# Patient Record
Sex: FEMALE | Race: WHITE | NOT HISPANIC OR LATINO | Employment: OTHER | URBAN - METROPOLITAN AREA
[De-identification: names, ages, dates, MRNs, and addresses within clinical notes are randomized per-mention and may not be internally consistent; named-entity substitution may affect disease eponyms.]

---

## 2023-10-04 ENCOUNTER — HOSPITAL ENCOUNTER (OUTPATIENT)
Dept: RADIOLOGY | Facility: HOSPITAL | Age: 73
Discharge: HOME | End: 2023-10-04
Payer: MEDICARE

## 2023-10-04 DIAGNOSIS — Z78.0 ASYMPTOMATIC MENOPAUSAL STATE: ICD-10-CM

## 2023-10-04 PROCEDURE — 77080 DXA BONE DENSITY AXIAL: CPT

## 2023-10-04 PROCEDURE — 77085 DXA BONE DENSITY AXL VRT FX: CPT

## 2023-10-04 PROCEDURE — 77085 DXA BONE DENSITY AXL VRT FX: CPT | Performed by: RADIOLOGY

## 2023-11-21 ENCOUNTER — TELEPHONE (OUTPATIENT)
Dept: OBSTETRICS AND GYNECOLOGY | Facility: CLINIC | Age: 73
End: 2023-11-21
Payer: MEDICARE

## 2023-11-21 DIAGNOSIS — M81.0 OSTEOPOROSIS, UNSPECIFIED OSTEOPOROSIS TYPE, UNSPECIFIED PATHOLOGICAL FRACTURE PRESENCE: ICD-10-CM

## 2023-11-21 RX ORDER — ALENDRONATE SODIUM 70 MG/1
70 TABLET ORAL
COMMUNITY
End: 2023-11-21 | Stop reason: SDUPTHER

## 2023-11-21 RX ORDER — ALENDRONATE SODIUM 70 MG/1
70 TABLET ORAL
Qty: 4 TABLET | Refills: 10 | Status: SHIPPED | OUTPATIENT
Start: 2023-11-21

## 2023-11-21 NOTE — TELEPHONE ENCOUNTER
PT CALLED AND LEFT VM ON RX LINE IN BB TO ADVISE SHE NOW IS USING [facility] IN [place] FOR HER RX SO SHE NEEDS ALENDRONATE 70MG. [facility] PT AND SHE HAD HER ANNUAL ON 9-11-23

## 2024-01-29 ENCOUNTER — LAB (OUTPATIENT)
Dept: LAB | Facility: LAB | Age: 74
End: 2024-01-29
Payer: MEDICARE

## 2024-01-29 ENCOUNTER — OFFICE VISIT (OUTPATIENT)
Dept: PRIMARY CARE | Facility: CLINIC | Age: 74
End: 2024-01-29
Payer: MEDICARE

## 2024-01-29 VITALS
HEIGHT: 66 IN | SYSTOLIC BLOOD PRESSURE: 126 MMHG | DIASTOLIC BLOOD PRESSURE: 84 MMHG | WEIGHT: 196 LBS | HEART RATE: 64 BPM | BODY MASS INDEX: 31.5 KG/M2

## 2024-01-29 DIAGNOSIS — M81.0 OSTEOPOROSIS, UNSPECIFIED OSTEOPOROSIS TYPE, UNSPECIFIED PATHOLOGICAL FRACTURE PRESENCE: ICD-10-CM

## 2024-01-29 DIAGNOSIS — E78.2 MIXED HYPERLIPIDEMIA: ICD-10-CM

## 2024-01-29 DIAGNOSIS — E55.9 VITAMIN D DEFICIENCY: ICD-10-CM

## 2024-01-29 DIAGNOSIS — M25.562 LEFT KNEE PAIN, UNSPECIFIED CHRONICITY: ICD-10-CM

## 2024-01-29 DIAGNOSIS — Z00.00 MEDICARE ANNUAL WELLNESS VISIT, SUBSEQUENT: ICD-10-CM

## 2024-01-29 DIAGNOSIS — Z00.00 HEALTHCARE MAINTENANCE: ICD-10-CM

## 2024-01-29 DIAGNOSIS — E66.9 CLASS 1 OBESITY WITHOUT SERIOUS COMORBIDITY WITH BODY MASS INDEX (BMI) OF 31.0 TO 31.9 IN ADULT, UNSPECIFIED OBESITY TYPE: ICD-10-CM

## 2024-01-29 DIAGNOSIS — Z00.00 MEDICARE ANNUAL WELLNESS VISIT, SUBSEQUENT: Primary | ICD-10-CM

## 2024-01-29 DIAGNOSIS — H40.9 GLAUCOMA OF BOTH EYES, UNSPECIFIED GLAUCOMA TYPE: ICD-10-CM

## 2024-01-29 PROBLEM — E66.811 CLASS 1 OBESITY WITHOUT SERIOUS COMORBIDITY WITH BODY MASS INDEX (BMI) OF 31.0 TO 31.9 IN ADULT: Status: ACTIVE | Noted: 2024-01-29

## 2024-01-29 LAB
ALBUMIN SERPL BCP-MCNC: 4.4 G/DL (ref 3.4–5)
ALP SERPL-CCNC: 72 U/L (ref 33–136)
ALT SERPL W P-5'-P-CCNC: 19 U/L (ref 7–45)
ANION GAP SERPL CALC-SCNC: 12 MMOL/L (ref 10–20)
AST SERPL W P-5'-P-CCNC: 18 U/L (ref 9–39)
BASOPHILS # BLD AUTO: 0.01 X10*3/UL (ref 0–0.1)
BASOPHILS NFR BLD AUTO: 0.2 %
BILIRUB SERPL-MCNC: 0.5 MG/DL (ref 0–1.2)
BUN SERPL-MCNC: 14 MG/DL (ref 6–23)
CALCIUM SERPL-MCNC: 9.7 MG/DL (ref 8.6–10.3)
CHLORIDE SERPL-SCNC: 108 MMOL/L (ref 98–107)
CHOLEST SERPL-MCNC: 200 MG/DL (ref 0–199)
CHOLESTEROL/HDL RATIO: 5.1
CO2 SERPL-SCNC: 26 MMOL/L (ref 21–32)
CREAT SERPL-MCNC: 0.83 MG/DL (ref 0.5–1.05)
EGFRCR SERPLBLD CKD-EPI 2021: 74 ML/MIN/1.73M*2
EOSINOPHIL # BLD AUTO: 0 X10*3/UL (ref 0–0.4)
EOSINOPHIL NFR BLD AUTO: 0 %
ERYTHROCYTE [DISTWIDTH] IN BLOOD BY AUTOMATED COUNT: 13.3 % (ref 11.5–14.5)
GLUCOSE SERPL-MCNC: 78 MG/DL (ref 74–99)
HCT VFR BLD AUTO: 46.4 % (ref 36–46)
HDLC SERPL-MCNC: 39.5 MG/DL
HGB BLD-MCNC: 15.3 G/DL (ref 12–16)
IMM GRANULOCYTES # BLD AUTO: 0.01 X10*3/UL (ref 0–0.5)
IMM GRANULOCYTES NFR BLD AUTO: 0.2 % (ref 0–0.9)
LDLC SERPL CALC-MCNC: 127 MG/DL
LYMPHOCYTES # BLD AUTO: 1.17 X10*3/UL (ref 0.8–3)
LYMPHOCYTES NFR BLD AUTO: 23.8 %
MCH RBC QN AUTO: 30.5 PG (ref 26–34)
MCHC RBC AUTO-ENTMCNC: 33 G/DL (ref 32–36)
MCV RBC AUTO: 92 FL (ref 80–100)
MONOCYTES # BLD AUTO: 0.4 X10*3/UL (ref 0.05–0.8)
MONOCYTES NFR BLD AUTO: 8.1 %
NEUTROPHILS # BLD AUTO: 3.33 X10*3/UL (ref 1.6–5.5)
NEUTROPHILS NFR BLD AUTO: 67.7 %
NON HDL CHOLESTEROL: 161 MG/DL (ref 0–149)
NRBC BLD-RTO: 0 /100 WBCS (ref 0–0)
PLATELET # BLD AUTO: 248 X10*3/UL (ref 150–450)
POTASSIUM SERPL-SCNC: 4.4 MMOL/L (ref 3.5–5.3)
PROT SERPL-MCNC: 7.2 G/DL (ref 6.4–8.2)
RBC # BLD AUTO: 5.02 X10*6/UL (ref 4–5.2)
SODIUM SERPL-SCNC: 142 MMOL/L (ref 136–145)
TRIGL SERPL-MCNC: 167 MG/DL (ref 0–149)
TSH SERPL-ACNC: 2.59 MIU/L (ref 0.44–3.98)
URATE SERPL-MCNC: 4 MG/DL (ref 2.3–6.7)
VLDL: 33 MG/DL (ref 0–40)
WBC # BLD AUTO: 4.9 X10*3/UL (ref 4.4–11.3)

## 2024-01-29 PROCEDURE — 83036 HEMOGLOBIN GLYCOSYLATED A1C: CPT

## 2024-01-29 PROCEDURE — 84550 ASSAY OF BLOOD/URIC ACID: CPT

## 2024-01-29 PROCEDURE — G0439 PPPS, SUBSEQ VISIT: HCPCS | Performed by: STUDENT IN AN ORGANIZED HEALTH CARE EDUCATION/TRAINING PROGRAM

## 2024-01-29 PROCEDURE — 3008F BODY MASS INDEX DOCD: CPT | Performed by: STUDENT IN AN ORGANIZED HEALTH CARE EDUCATION/TRAINING PROGRAM

## 2024-01-29 PROCEDURE — 80061 LIPID PANEL: CPT

## 2024-01-29 PROCEDURE — 36415 COLL VENOUS BLD VENIPUNCTURE: CPT

## 2024-01-29 PROCEDURE — 1170F FXNL STATUS ASSESSED: CPT | Performed by: STUDENT IN AN ORGANIZED HEALTH CARE EDUCATION/TRAINING PROGRAM

## 2024-01-29 PROCEDURE — 1158F ADVNC CARE PLAN TLK DOCD: CPT | Performed by: STUDENT IN AN ORGANIZED HEALTH CARE EDUCATION/TRAINING PROGRAM

## 2024-01-29 PROCEDURE — 1159F MED LIST DOCD IN RCRD: CPT | Performed by: STUDENT IN AN ORGANIZED HEALTH CARE EDUCATION/TRAINING PROGRAM

## 2024-01-29 PROCEDURE — 1123F ACP DISCUSS/DSCN MKR DOCD: CPT | Performed by: STUDENT IN AN ORGANIZED HEALTH CARE EDUCATION/TRAINING PROGRAM

## 2024-01-29 PROCEDURE — 84443 ASSAY THYROID STIM HORMONE: CPT

## 2024-01-29 PROCEDURE — 85025 COMPLETE CBC W/AUTO DIFF WBC: CPT

## 2024-01-29 PROCEDURE — 80053 COMPREHEN METABOLIC PANEL: CPT

## 2024-01-29 PROCEDURE — 1160F RVW MEDS BY RX/DR IN RCRD: CPT | Performed by: STUDENT IN AN ORGANIZED HEALTH CARE EDUCATION/TRAINING PROGRAM

## 2024-01-29 PROCEDURE — 1036F TOBACCO NON-USER: CPT | Performed by: STUDENT IN AN ORGANIZED HEALTH CARE EDUCATION/TRAINING PROGRAM

## 2024-01-29 PROCEDURE — 1157F ADVNC CARE PLAN IN RCRD: CPT | Performed by: STUDENT IN AN ORGANIZED HEALTH CARE EDUCATION/TRAINING PROGRAM

## 2024-01-29 PROCEDURE — 99204 OFFICE O/P NEW MOD 45 MIN: CPT | Performed by: STUDENT IN AN ORGANIZED HEALTH CARE EDUCATION/TRAINING PROGRAM

## 2024-01-29 RX ORDER — BRIMONIDINE TARTRATE 2 MG/ML
SOLUTION/ DROPS OPHTHALMIC
COMMUNITY
Start: 2024-01-09

## 2024-01-29 RX ORDER — PNV NO.95/FERROUS FUM/FOLIC AC 28MG-0.8MG
TABLET ORAL
COMMUNITY
Start: 2022-09-15

## 2024-01-29 RX ORDER — MELOXICAM 15 MG/1
15 TABLET ORAL
COMMUNITY
Start: 2023-10-31

## 2024-01-29 RX ORDER — LATANOPROST 50 UG/ML
1 SOLUTION/ DROPS OPHTHALMIC
COMMUNITY
Start: 2021-03-23

## 2024-01-29 RX ORDER — CHOLECALCIFEROL (VITAMIN D3) 125 MCG
5000 CAPSULE ORAL
COMMUNITY
Start: 2022-06-29

## 2024-01-29 RX ORDER — TIMOLOL MALEATE 5 MG/ML
1 SOLUTION/ DROPS OPHTHALMIC 2 TIMES DAILY
COMMUNITY
Start: 2021-03-04

## 2024-01-29 ASSESSMENT — ENCOUNTER SYMPTOMS
OCCASIONAL FEELINGS OF UNSTEADINESS: 0
DEPRESSION: 0
LOSS OF SENSATION IN FEET: 0

## 2024-01-29 ASSESSMENT — PATIENT HEALTH QUESTIONNAIRE - PHQ9
2. FEELING DOWN, DEPRESSED OR HOPELESS: NOT AT ALL
1. LITTLE INTEREST OR PLEASURE IN DOING THINGS: NOT AT ALL
SUM OF ALL RESPONSES TO PHQ9 QUESTIONS 1 AND 2: 0

## 2024-01-29 ASSESSMENT — ACTIVITIES OF DAILY LIVING (ADL)
GROCERY_SHOPPING: INDEPENDENT
MANAGING_FINANCES: INDEPENDENT
BATHING: INDEPENDENT
TAKING_MEDICATION: INDEPENDENT
DOING_HOUSEWORK: INDEPENDENT

## 2024-01-29 NOTE — PROGRESS NOTES
Subjective   Reason for Visit: Georgie Robles is an 73 y.o. female here for a Medicare Wellness visit.     Past Medical, Surgical, and Family History reviewed and updated in chart.    Reviewed all medications by prescribing practitioner or clinical pharmacist (such as prescriptions, OTCs, herbal therapies and supplements) and documented in the medical record.    HPI  New patient visit/ Health maintenance/Medicare wellness visit  Overall patient is doing well.  Denies any chest pain, shortness of breath or wheezing on exertion.  Denies any nausea/vomiting or diarrhea/constipation.  Denies any urinary symptoms.  Denies any dizziness or balance problems.    Immunization: Tdap declined  Influenza vaccine declined  Shingrix 2 doses  , Prevnar 20 declined  COVID-19 vaccine (Pfizer Moderna) 3 doses:  Colon Cancer Screening: No family history, Cologuard 05/2023 was negative  OB/GYN: Pap smear last 2015, deferred, mammogram 08/2023 is up to date.   Diet: Tries to eat a well-balanced diet, has cut down on red meat, states that she is struggling to lose some weight  Exercise: Used to exercise regularly and go for hiking until 6 months ago, has stopped due to her left knee pain  Tobacco: Denies use  EtOH: Rarely Socially      Hyperlipidemia  Last lipid panel Chol 236, , Triglycerides 123  Patient has a hx of statin intolerance  Patient was started on Zetia 10 mg 6 months ago, she continues to take it only for 3 weeks, then she started experiencing side effect of medication.  She states she had several joints pain and and swelling, also she had muscle pain.  Has stopped any cholesterol-lowering medication, she is trying to control it with diet modification.  Willing to assess cardiac calcium score, for further evaluation and treatment plan    Osteoporosis  Follows with OBGYN Dr. Gottlieb  Last Dexa 10/2023 showed osteopenia  Currently on Alendronate 70 mg daily and Calcium suplements    Vitamin D deficiency  Last  "Vitamin D level wnl  Continues to take Calcium and Vit D    Glaucoma  Follows with ophthalmology    6.  Left knee pain/right elbow pain  Patient states that she has been experiencing left knee pain and right elbow pain started 6 months ago.  The pain has limited her in doing exercises.  Willing to check her uric acid level    Allergies   Allergen Reactions    Epinephrine Syncope     Passes out    Sulfa (Sulfonamide Antibiotics) Unknown       Immunization History   Administered Date(s) Administered    Influenza, High Dose Seasonal, Preservative Free 11/03/2020    Pfizer COVID-19 vaccine, Fall 2023, 12 years and older, (30mcg/0.3mL) 10/31/2023    Pfizer Purple Cap SARS-CoV-2 02/25/2021, 03/25/2021, 10/22/2021    Zoster vaccine, recombinant, adult (SHINGRIX) 06/09/2022, 08/11/2022       Patient Self Assessment of Health Status  Patient Self Assessment: Good    Nutrition and Exercise  Current Diet: Well Balanced Diet  Adequate Fluid Intake: Yes  Caffeine: Yes  Exercise Frequency: No Exercise    Functional Ability/Level of Safety  Cognitive Impairment Observed: No cognitive impairment observed  Cognitive Impairment Reported: No cognitive impairment reported by patient or family    Home Safety Risk Factors: None    Patient Care Team:  Belinda Gallego DO as PCP - General (Family Medicine)     Review of Systems  All pertinent positive symptoms are included in the history of present illness.    All other systems have been reviewed and are negative and noncontributory to this patient's current ailments.    Objective   Vitals:  /84   Pulse 64   Ht 1.676 m (5' 6\")   Wt 88.9 kg (196 lb)   BMI 31.64 kg/m²          Physical Exam  CONSTITUTIONAL - well nourished, well developed, looks like stated age, in no acute distress  SKIN - normal skin color and pigmentation, normal skin turgor without rash, lesions, or nodules visualized  HEAD - no trauma, normocephalic  EYES - pupils are equal and reactive to light, " extraocular muscles are intact, and normal external exam  ENT - uvula midline, normal tongue movement, throat without erythema or exudate, nasal passage without discharge and patent  NECK - supple without rigidity, no neck mass was observed, no thyromegaly   CHEST - clear to auscultation, no wheezing, no crackles and no rales, good effort  CARDIAC - regular rhythm and rate, no murmurs, normal S1 and S2  ABDOMEN - no organomegaly, soft, nontender, nondistended, normal bowel sounds, no guarding/rebound/rigidity  EXTREMITIES - no edema, no deformities  NEUROLOGICAL - normal gait, normal balance, normal motor, no ataxia, alert and oriented x3   PSYCHIATRIC - alert, pleasant and cordial, age-appropriate  IMMUNOLOGIC - no cervical lymphadenopathy     Assessment/Plan   Problem List Items Addressed This Visit       Class 1 obesity without serious comorbidity with body mass index (BMI) of 31.0 to 31.9 in adult    Glaucoma of both eyes    Vitamin D deficiency    Osteoporosis    Mixed hyperlipidemia    Relevant Orders    Lipid Panel     Other Visit Diagnoses       Medicare annual wellness visit, subsequent    -  Primary    Relevant Orders    TSH with reflex to Free T4 if abnormal    Lipid Panel    Comprehensive Metabolic Panel    CBC and Auto Differential    Hemoglobin A1C    Healthcare maintenance        Relevant Orders    TSH with reflex to Free T4 if abnormal    Lipid Panel    Comprehensive Metabolic Panel    CBC and Auto Differential    Hemoglobin A1C    Need for Tdap vaccination        Left knee pain, unspecified chronicity              Medicare wellness visit/health maintenance  Complete history and physical examination was performed  Requisition for CBC, A1c, CMP, lipid panel, TSH was provided today  We will notify of test results once available and make treatment recommendations accordingly   We had a discussion about preventing future Mis, ways to improve diet and weight loss.  We had a discussion about advanced  directive and power of .  Patient states that her daughter is her healthcare power of  D.  Encouraged the patient to bring all the paperwork to the office    Hyperlipidemia/obesity  Requisition for lipid panel and CT cardiac calcium score was provided today  Please call to schedule appointment at earliest mutual convenience.  Will review results and make treatment recommendation accordingly    Osteoporosis  Continue to follow with OB/GYN.  Continue to take alendronate 70 mg daily and calcium supplement    Vitamin D deficiency  Continue to take vitamin D supplements alongside calcium supplements    Glaucoma  Continue to follow with ophthalmology.  No further recommendation at this time    Left knee pain  Requisition for uric acid level was provided today to rule out/confirm any uric acid imbalance.  If uric acid level is high, in concordance with clinical presentation we will start treating for  gout.  Considering investigating further with knee x-rays if the knee pain persists.  Consider treating with NSAIDs and physical therapy if osteoarthritis is noted.    Thank you for letting us be a part of your care team.  Please call the office if you have further questions or concerns regarding your care    Otherwise, please follow-up in 6 months for continued care and refills     Desire Cabrera MD  PGY1 FM Resident

## 2024-01-30 DIAGNOSIS — M25.562 LEFT KNEE PAIN, UNSPECIFIED CHRONICITY: Primary | ICD-10-CM

## 2024-01-30 LAB
EST. AVERAGE GLUCOSE BLD GHB EST-MCNC: 94 MG/DL
HBA1C MFR BLD: 4.9 %

## 2024-03-04 ENCOUNTER — HOSPITAL ENCOUNTER (OUTPATIENT)
Dept: RADIOLOGY | Facility: HOSPITAL | Age: 74
Discharge: HOME | End: 2024-03-04
Payer: MEDICARE

## 2024-03-04 DIAGNOSIS — E78.2 MIXED HYPERLIPIDEMIA: ICD-10-CM

## 2024-03-04 DIAGNOSIS — M25.562 LEFT KNEE PAIN, UNSPECIFIED CHRONICITY: ICD-10-CM

## 2024-03-04 PROCEDURE — 75571 CT HRT W/O DYE W/CA TEST: CPT

## 2024-03-04 PROCEDURE — 73562 X-RAY EXAM OF KNEE 3: CPT | Mod: LEFT SIDE | Performed by: RADIOLOGY

## 2024-03-04 PROCEDURE — 73562 X-RAY EXAM OF KNEE 3: CPT | Mod: LT

## 2024-08-19 ENCOUNTER — TELEPHONE (OUTPATIENT)
Dept: OBSTETRICS AND GYNECOLOGY | Facility: HOSPITAL | Age: 74
End: 2024-08-19
Payer: MEDICARE

## 2024-08-19 DIAGNOSIS — Z12.31 VISIT FOR SCREENING MAMMOGRAM: Primary | ICD-10-CM

## 2024-08-23 ENCOUNTER — HOSPITAL ENCOUNTER (OUTPATIENT)
Dept: RADIOLOGY | Facility: HOSPITAL | Age: 74
Discharge: HOME | End: 2024-08-23
Payer: MEDICARE

## 2024-08-23 VITALS — HEIGHT: 66 IN | BODY MASS INDEX: 31.5 KG/M2 | WEIGHT: 196 LBS

## 2024-08-23 DIAGNOSIS — Z12.31 VISIT FOR SCREENING MAMMOGRAM: ICD-10-CM

## 2024-08-23 PROCEDURE — 77067 SCR MAMMO BI INCL CAD: CPT

## 2024-08-23 PROCEDURE — 77063 BREAST TOMOSYNTHESIS BI: CPT | Performed by: STUDENT IN AN ORGANIZED HEALTH CARE EDUCATION/TRAINING PROGRAM

## 2024-08-23 PROCEDURE — 77067 SCR MAMMO BI INCL CAD: CPT | Performed by: STUDENT IN AN ORGANIZED HEALTH CARE EDUCATION/TRAINING PROGRAM

## 2024-10-01 ENCOUNTER — OFFICE VISIT (OUTPATIENT)
Dept: PRIMARY CARE | Facility: CLINIC | Age: 74
End: 2024-10-01
Payer: MEDICARE

## 2024-10-01 VITALS
SYSTOLIC BLOOD PRESSURE: 146 MMHG | DIASTOLIC BLOOD PRESSURE: 86 MMHG | HEART RATE: 71 BPM | OXYGEN SATURATION: 98 % | WEIGHT: 192 LBS | BODY MASS INDEX: 30.99 KG/M2

## 2024-10-01 DIAGNOSIS — I10 PRIMARY HYPERTENSION: Primary | ICD-10-CM

## 2024-10-01 PROCEDURE — 99214 OFFICE O/P EST MOD 30 MIN: CPT | Performed by: STUDENT IN AN ORGANIZED HEALTH CARE EDUCATION/TRAINING PROGRAM

## 2024-10-01 PROCEDURE — 3077F SYST BP >= 140 MM HG: CPT | Performed by: STUDENT IN AN ORGANIZED HEALTH CARE EDUCATION/TRAINING PROGRAM

## 2024-10-01 PROCEDURE — 93000 ELECTROCARDIOGRAM COMPLETE: CPT | Performed by: STUDENT IN AN ORGANIZED HEALTH CARE EDUCATION/TRAINING PROGRAM

## 2024-10-01 PROCEDURE — 3079F DIAST BP 80-89 MM HG: CPT | Performed by: STUDENT IN AN ORGANIZED HEALTH CARE EDUCATION/TRAINING PROGRAM

## 2024-10-01 PROCEDURE — 1157F ADVNC CARE PLAN IN RCRD: CPT | Performed by: STUDENT IN AN ORGANIZED HEALTH CARE EDUCATION/TRAINING PROGRAM

## 2024-10-01 PROCEDURE — 1160F RVW MEDS BY RX/DR IN RCRD: CPT | Performed by: STUDENT IN AN ORGANIZED HEALTH CARE EDUCATION/TRAINING PROGRAM

## 2024-10-01 PROCEDURE — 1036F TOBACCO NON-USER: CPT | Performed by: STUDENT IN AN ORGANIZED HEALTH CARE EDUCATION/TRAINING PROGRAM

## 2024-10-01 PROCEDURE — 1159F MED LIST DOCD IN RCRD: CPT | Performed by: STUDENT IN AN ORGANIZED HEALTH CARE EDUCATION/TRAINING PROGRAM

## 2024-10-01 RX ORDER — AMLODIPINE BESYLATE 2.5 MG/1
2.5 TABLET ORAL DAILY
Qty: 30 TABLET | Refills: 0 | Status: SHIPPED | OUTPATIENT
Start: 2024-10-01 | End: 2024-10-31

## 2024-10-01 ASSESSMENT — PATIENT HEALTH QUESTIONNAIRE - PHQ9
1. LITTLE INTEREST OR PLEASURE IN DOING THINGS: NOT AT ALL
2. FEELING DOWN, DEPRESSED OR HOPELESS: NOT AT ALL
SUM OF ALL RESPONSES TO PHQ9 QUESTIONS 1 AND 2: 0

## 2024-10-01 NOTE — PROGRESS NOTES
Georgie Robles is a 74 y.o. year old female presenting for Hypertension       HPI:  1. Hypertension  Georgie is a 74 year old female presenting after multiple at home high blood pressure readings  She states that on Sunday she felt increasingly fatigued and decided to check her blood pressure with an at-home cuff; her result was 150's/90's and consistent over multiple checks  Her friend suggested she call paramedics to evaluate her further and they also obtained multiple elevated blood pressure with similar ranges, but determined that her next steps should be a PCP evaluation and did not require an ED visit  In the office today she continues to be elevated at 146/86  She feels well overall and denies any chest pain, shortness of breath, or lightheadedness    She does endorse occasional sharp pain above her umbilicus that lasts 2-5 seconds that started on Sunday  There is no known trigger and she thinks could be related to a loss of appetite since Sunday with minimal food intake  She denies any radiating pain, reflux, changes in bowel habits, bloody stool or rashes in the area    ECG was performed in the office and was unremarkable for any acute changes and a coronary CT score done in March 2024 was 0.18    ROS:   All pertinent positive symptoms are included in history of present illness.    All other systems have been reviewed and are negative and noncontributory to this patient's current ailments.    Current Outpatient Medications   Medication Sig Dispense Refill    alendronate (Fosamax) 70 mg tablet Take 1 tablet (70 mg) by mouth 1 (one) time per week. 4 tablet 10    amLODIPine (Norvasc) 2.5 mg tablet Take 1 tablet (2.5 mg) by mouth once daily. 30 tablet 0    brimonidine (AlphaGAN) 0.2 % ophthalmic solution       cholecalciferol (Vitamin D-3) 125 MCG (5000 UT) capsule Take 1 capsule (125 mcg) by mouth once daily.      cyanocobalamin (Vitamin B-12) 100 mcg tablet       latanoprost (Xalatan) 0.005 % ophthalmic solution  Administer 1 drop into affected eye(s).      timolol (Timoptic) 0.5 % ophthalmic solution Administer 1 drop into affected eye(s) twice a day.       No current facility-administered medications for this visit.       OBJECTIVE  Visit Vitals  /86 (BP Location: Left arm, Patient Position: Sitting, BP Cuff Size: Adult)   Pulse 71   Wt 87.1 kg (192 lb)   SpO2 98%   BMI 30.99 kg/m²   OB Status Postmenopausal   Smoking Status Never   BSA 2.01 m²        Physical Exam:  GENERAL: Alert, oriented, pleasant, in no acute distress  HEENT: Head normocephalic, atraumatic  CV: Heart with regular rate and rhythm, normal S1/S2, no murmurs; normal peripheral pulses- radial and dorsalis pedis palpable  LUNGS: CTAB without wheezing, rhonchi or rales; good respiratory effort, no increased work of breathing  ABDOMEN: soft, non-tender, non-distended, no masses appreciated; +BS in all four quadrants  EXTREMITIES: no edema, no cyanosis  PSYCH: Appropriate mood and affect  SKIN: No rashes or lesions appreciated    Assessment/Plan   Diagnoses and all orders for this visit:  Primary hypertension  Based on the history of your complaint and unremarkable ECG findings, you likely have uncontrolled hypertension which requires medical management to control    We will start you on a low dose of amlodipine to help manage your elevated blood pressure  This medication works by relaxing the blood vessels, making it easier for your heart to pump  Please continue to monitor your blood pressure at home and keep track of any symptoms you may experience  It is important to maintain a healthy lifestyle alongside the medication, including a balanced diet and regular exercise    -     amLODIPine (Norvasc) 2.5 mg tablet; Take 1 tablet (2.5 mg) by mouth once daily.  -     ECG 12 lead (Clinic Performed)    We would like to follow up in 2 weeks to assess your response to the treatment and make any necessary adjustments  If you have any questions or concerns in  the meantime, don’t hesitate to reach out

## 2024-10-14 ENCOUNTER — TELEPHONE (OUTPATIENT)
Dept: PRIMARY CARE | Facility: CLINIC | Age: 74
End: 2024-10-14
Payer: MEDICARE

## 2024-10-14 DIAGNOSIS — I10 PRIMARY HYPERTENSION: Primary | ICD-10-CM

## 2024-10-14 RX ORDER — OLMESARTAN MEDOXOMIL 20 MG/1
20 TABLET ORAL DAILY
Qty: 30 TABLET | Refills: 0 | Status: SHIPPED | OUTPATIENT
Start: 2024-10-14 | End: 2024-11-13

## 2024-10-14 NOTE — TELEPHONE ENCOUNTER
Blood pressure update.     Average 160/94      158/99  175/99  144/108  180/107  142/84  169/93    Pt feels like the medication is making her slightly nauseous as well as some fatigue and a few episodes of shakes normally when she is laying down / sleeping.

## 2024-10-15 ENCOUNTER — TELEPHONE (OUTPATIENT)
Dept: PRIMARY CARE | Facility: CLINIC | Age: 74
End: 2024-10-15
Payer: MEDICARE

## 2024-10-29 ENCOUNTER — APPOINTMENT (OUTPATIENT)
Dept: PRIMARY CARE | Facility: CLINIC | Age: 74
End: 2024-10-29
Payer: MEDICARE

## 2024-10-29 ENCOUNTER — LAB (OUTPATIENT)
Dept: LAB | Facility: LAB | Age: 74
End: 2024-10-29
Payer: MEDICARE

## 2024-10-29 VITALS
BODY MASS INDEX: 30.53 KG/M2 | HEIGHT: 66 IN | DIASTOLIC BLOOD PRESSURE: 60 MMHG | HEART RATE: 60 BPM | SYSTOLIC BLOOD PRESSURE: 120 MMHG | WEIGHT: 190 LBS

## 2024-10-29 DIAGNOSIS — R53.83 FATIGUE, UNSPECIFIED TYPE: ICD-10-CM

## 2024-10-29 DIAGNOSIS — I10 PRIMARY HYPERTENSION: Primary | ICD-10-CM

## 2024-10-29 DIAGNOSIS — R42 LIGHTHEADEDNESS: ICD-10-CM

## 2024-10-29 DIAGNOSIS — E55.9 VITAMIN D DEFICIENCY: ICD-10-CM

## 2024-10-29 DIAGNOSIS — I10 PRIMARY HYPERTENSION: ICD-10-CM

## 2024-10-29 LAB
25(OH)D3 SERPL-MCNC: 43 NG/ML (ref 30–100)
ALBUMIN SERPL BCP-MCNC: 4.6 G/DL (ref 3.4–5)
ALP SERPL-CCNC: 82 U/L (ref 33–136)
ALT SERPL W P-5'-P-CCNC: 21 U/L (ref 7–45)
ANION GAP SERPL CALC-SCNC: 13 MMOL/L (ref 10–20)
AST SERPL W P-5'-P-CCNC: 17 U/L (ref 9–39)
BASOPHILS # BLD AUTO: 0.01 X10*3/UL (ref 0–0.1)
BASOPHILS NFR BLD AUTO: 0.2 %
BILIRUB SERPL-MCNC: 0.5 MG/DL (ref 0–1.2)
BUN SERPL-MCNC: 17 MG/DL (ref 6–23)
CALCIUM SERPL-MCNC: 9.6 MG/DL (ref 8.6–10.3)
CHLORIDE SERPL-SCNC: 108 MMOL/L (ref 98–107)
CO2 SERPL-SCNC: 24 MMOL/L (ref 21–32)
CREAT SERPL-MCNC: 0.71 MG/DL (ref 0.5–1.05)
EGFRCR SERPLBLD CKD-EPI 2021: 89 ML/MIN/1.73M*2
EOSINOPHIL # BLD AUTO: 0 X10*3/UL (ref 0–0.4)
EOSINOPHIL NFR BLD AUTO: 0 %
ERYTHROCYTE [DISTWIDTH] IN BLOOD BY AUTOMATED COUNT: 12.7 % (ref 11.5–14.5)
EST. AVERAGE GLUCOSE BLD GHB EST-MCNC: 94 MG/DL
GLUCOSE SERPL-MCNC: 87 MG/DL (ref 74–99)
HBA1C MFR BLD: 4.9 %
HCT VFR BLD AUTO: 46.8 % (ref 36–46)
HGB BLD-MCNC: 16.5 G/DL (ref 12–16)
IMM GRANULOCYTES # BLD AUTO: 0.02 X10*3/UL (ref 0–0.5)
IMM GRANULOCYTES NFR BLD AUTO: 0.4 % (ref 0–0.9)
IRON SATN MFR SERPL: 39 % (ref 25–45)
IRON SERPL-MCNC: 122 UG/DL (ref 35–150)
LYMPHOCYTES # BLD AUTO: 1.31 X10*3/UL (ref 0.8–3)
LYMPHOCYTES NFR BLD AUTO: 23.6 %
MCH RBC QN AUTO: 31 PG (ref 26–34)
MCHC RBC AUTO-ENTMCNC: 35.3 G/DL (ref 32–36)
MCV RBC AUTO: 88 FL (ref 80–100)
MONOCYTES # BLD AUTO: 0.46 X10*3/UL (ref 0.05–0.8)
MONOCYTES NFR BLD AUTO: 8.3 %
NEUTROPHILS # BLD AUTO: 3.74 X10*3/UL (ref 1.6–5.5)
NEUTROPHILS NFR BLD AUTO: 67.5 %
NRBC BLD-RTO: 0 /100 WBCS (ref 0–0)
PLATELET # BLD AUTO: 281 X10*3/UL (ref 150–450)
POTASSIUM SERPL-SCNC: 3.7 MMOL/L (ref 3.5–5.3)
PROT SERPL-MCNC: 7.5 G/DL (ref 6.4–8.2)
RBC # BLD AUTO: 5.33 X10*6/UL (ref 4–5.2)
SODIUM SERPL-SCNC: 141 MMOL/L (ref 136–145)
TIBC SERPL-MCNC: 310 UG/DL (ref 240–445)
TSH SERPL-ACNC: 1.71 MIU/L (ref 0.44–3.98)
UIBC SERPL-MCNC: 188 UG/DL (ref 110–370)
VIT B12 SERPL-MCNC: 3503 PG/ML (ref 211–911)
WBC # BLD AUTO: 5.5 X10*3/UL (ref 4.4–11.3)

## 2024-10-29 PROCEDURE — 83036 HEMOGLOBIN GLYCOSYLATED A1C: CPT

## 2024-10-29 PROCEDURE — 3074F SYST BP LT 130 MM HG: CPT | Performed by: STUDENT IN AN ORGANIZED HEALTH CARE EDUCATION/TRAINING PROGRAM

## 2024-10-29 PROCEDURE — 1036F TOBACCO NON-USER: CPT | Performed by: STUDENT IN AN ORGANIZED HEALTH CARE EDUCATION/TRAINING PROGRAM

## 2024-10-29 PROCEDURE — 3078F DIAST BP <80 MM HG: CPT | Performed by: STUDENT IN AN ORGANIZED HEALTH CARE EDUCATION/TRAINING PROGRAM

## 2024-10-29 PROCEDURE — 99214 OFFICE O/P EST MOD 30 MIN: CPT | Performed by: STUDENT IN AN ORGANIZED HEALTH CARE EDUCATION/TRAINING PROGRAM

## 2024-10-29 PROCEDURE — 83540 ASSAY OF IRON: CPT

## 2024-10-29 PROCEDURE — 83550 IRON BINDING TEST: CPT

## 2024-10-29 PROCEDURE — 82607 VITAMIN B-12: CPT

## 2024-10-29 PROCEDURE — 1160F RVW MEDS BY RX/DR IN RCRD: CPT | Performed by: STUDENT IN AN ORGANIZED HEALTH CARE EDUCATION/TRAINING PROGRAM

## 2024-10-29 PROCEDURE — 85025 COMPLETE CBC W/AUTO DIFF WBC: CPT

## 2024-10-29 PROCEDURE — 1159F MED LIST DOCD IN RCRD: CPT | Performed by: STUDENT IN AN ORGANIZED HEALTH CARE EDUCATION/TRAINING PROGRAM

## 2024-10-29 PROCEDURE — 80053 COMPREHEN METABOLIC PANEL: CPT

## 2024-10-29 PROCEDURE — 1157F ADVNC CARE PLAN IN RCRD: CPT | Performed by: STUDENT IN AN ORGANIZED HEALTH CARE EDUCATION/TRAINING PROGRAM

## 2024-10-29 PROCEDURE — 36415 COLL VENOUS BLD VENIPUNCTURE: CPT

## 2024-10-29 PROCEDURE — 82306 VITAMIN D 25 HYDROXY: CPT

## 2024-10-29 PROCEDURE — G2211 COMPLEX E/M VISIT ADD ON: HCPCS | Performed by: STUDENT IN AN ORGANIZED HEALTH CARE EDUCATION/TRAINING PROGRAM

## 2024-10-29 PROCEDURE — 84443 ASSAY THYROID STIM HORMONE: CPT

## 2024-10-29 RX ORDER — OLMESARTAN MEDOXOMIL 20 MG/1
20 TABLET ORAL DAILY
Qty: 90 TABLET | Refills: 1 | Status: SHIPPED | OUTPATIENT
Start: 2024-10-29 | End: 2025-04-27

## 2024-10-31 ENCOUNTER — TELEPHONE (OUTPATIENT)
Dept: PRIMARY CARE | Facility: CLINIC | Age: 74
End: 2024-10-31
Payer: MEDICARE

## 2024-10-31 DIAGNOSIS — R79.89 HIGH SERUM VITAMIN B12: Primary | ICD-10-CM

## 2024-11-20 DIAGNOSIS — M81.0 OSTEOPOROSIS, UNSPECIFIED OSTEOPOROSIS TYPE, UNSPECIFIED PATHOLOGICAL FRACTURE PRESENCE: ICD-10-CM

## 2024-11-20 DIAGNOSIS — I10 PRIMARY HYPERTENSION: ICD-10-CM

## 2024-11-20 RX ORDER — OLMESARTAN MEDOXOMIL 20 MG/1
20 TABLET ORAL DAILY
Qty: 30 TABLET | Refills: 0 | OUTPATIENT
Start: 2024-11-20

## 2024-11-20 RX ORDER — ALENDRONATE SODIUM 70 MG/1
TABLET ORAL
Qty: 4 TABLET | Refills: 10 | Status: SHIPPED | OUTPATIENT
Start: 2024-11-20

## 2024-12-16 ENCOUNTER — TELEPHONE (OUTPATIENT)
Dept: PRIMARY CARE | Facility: CLINIC | Age: 74
End: 2024-12-16
Payer: MEDICARE

## 2024-12-16 NOTE — TELEPHONE ENCOUNTER
Bp recordings were pretty consistent   169/95  Pulse 59  163/87  Pulse 59  140/95  Pulse 70    Pt not sure if there is something she should be changing due to the increase in her blood pressure.   Please advise?              OFFICE NOTE:  Her blood pressure was greatly improved on the olmesartan 20 mg, almost too good at this point for her age, so we are going to consider cutting her olmesartan dose in half to 10 mg to prevent any hypotensive episodes or symptoms.  Since she has this lingering feeling of being unwell with fatigue and lightheadedness, I did order the above labs to evaluate her symptoms.  Further plan pending the above results.  Refill of her olmesartan will be sent to her pharmacy.  If she has any new or worsening symptoms, she should contact the office.

## 2025-01-15 ENCOUNTER — TELEPHONE (OUTPATIENT)
Dept: OBSTETRICS AND GYNECOLOGY | Facility: CLINIC | Age: 75
End: 2025-01-15
Payer: MEDICARE

## 2025-02-19 ENCOUNTER — APPOINTMENT (OUTPATIENT)
Dept: DERMATOLOGY | Facility: CLINIC | Age: 75
End: 2025-02-19
Payer: MEDICARE

## 2025-02-19 DIAGNOSIS — L57.0 ACTINIC KERATOSIS: Primary | ICD-10-CM

## 2025-02-19 DIAGNOSIS — L82.1 SEBORRHEIC KERATOSES: ICD-10-CM

## 2025-02-19 DIAGNOSIS — D22.9 MULTIPLE BENIGN MELANOCYTIC NEVI: ICD-10-CM

## 2025-02-19 DIAGNOSIS — Z12.83 ENCOUNTER FOR SCREENING FOR MALIGNANT NEOPLASM OF SKIN: ICD-10-CM

## 2025-02-19 DIAGNOSIS — L72.0 MILIA: ICD-10-CM

## 2025-02-19 DIAGNOSIS — D18.01 CHERRY ANGIOMA: ICD-10-CM

## 2025-02-19 PROCEDURE — 17000 DESTRUCT PREMALG LESION: CPT | Performed by: DERMATOLOGY

## 2025-02-19 PROCEDURE — 1157F ADVNC CARE PLAN IN RCRD: CPT | Performed by: DERMATOLOGY

## 2025-02-19 PROCEDURE — 99203 OFFICE O/P NEW LOW 30 MIN: CPT | Performed by: DERMATOLOGY

## 2025-02-19 NOTE — PROGRESS NOTES
Subjective     Georgie Robles is a 74 y.o. female who presents for the following: Skin Check.     New patient visit for skin check. Denies personal history of skin cancer    Milia on forehead, present for years, no prior treatment.     Previously seen by Judy Gomez years ago     Review of Systems:  No other skin or systemic complaints other than what is documented elsewhere in the note.    The following portions of the chart were reviewed this encounter and updated as appropriate:  Allergies       Specialty Problems    None    Past Medical History:  Georgie Robles  has a past medical history of Encounter for gynecological examination (general) (routine) without abnormal findings (05/11/2015), Encounter for screening for malignant neoplasm, site unspecified (05/11/2015), Personal history of other diseases of the musculoskeletal system and connective tissue, Personal history of other diseases of the musculoskeletal system and connective tissue, and Personal history of other diseases of the nervous system and sense organs.    Past Surgical History:  Georgie Robles  has a past surgical history that includes Tonsillectomy (05/11/2015); Other surgical history (05/24/2021); and MR angio head wo IV contrast (5/10/2022).    Family History:  Patient family history includes Breast cancer (age of onset: 50) in her mother.    Social History:  Georgie Robles  reports that she has never smoked. She has never used smokeless tobacco. No history on file for alcohol use and drug use.    Allergies:  Epinephrine, Ephedrine, and Sulfa (sulfonamide antibiotics)    Current Medications / CAM's:    Current Outpatient Medications:     alendronate (Fosamax) 70 mg tablet, TAKE ONE TABLET BY MOUTH ONE TIME A WEEK., Disp: 4 tablet, Rfl: 10    brimonidine (AlphaGAN) 0.2 % ophthalmic solution, , Disp: , Rfl:     cholecalciferol (Vitamin D-3) 125 MCG (5000 UT) capsule, Take 1 capsule (125 mcg) by mouth once daily., Disp: , Rfl:      cyanocobalamin (Vitamin B-12) 100 mcg tablet, , Disp: , Rfl:     latanoprost (Xalatan) 0.005 % ophthalmic solution, Administer 1 drop into affected eye(s)., Disp: , Rfl:     olmesartan (Benicar) 20 mg tablet, Take 1 tablet (20 mg) by mouth once daily., Disp: 90 tablet, Rfl: 1    timolol (Timoptic) 0.5 % ophthalmic solution, Administer 1 drop into affected eye(s) twice a day., Disp: , Rfl:      Objective   Well appearing patient in no apparent distress; mood and affect are within normal limits.    A full examination was performed including scalp, head, eyes, ears, nose, lips, neck, chest, axillae, abdomen, back, buttocks, bilateral upper extremities, bilateral lower extremities, hands, feet, fingers, toes, fingernails, and toenails. All findings within normal limits unless otherwise noted below.    - scattered regular brown macules and papules    - scattered small bright red papules and macules    - Scattered waxy tan/grey/brown papules with horn cysts    Generalized, Head - Anterior (Face), Neck - Posterior  Scattered 1-2 mm white papules    Head - Anterior (Face)  Erythematous macules with gritty scale.       Assessment/Plan   Actinic keratosis  Head - Anterior (Face)    - The premalignant nature of the disorder was reviewed and treatment options were reviewed.   - Patient agreeable to treatment with cryotherapy today.  Sites confirmed. Risks and benefits reviewed including but not limited to pain, redness, swelling, blister, scab, healing with hypo or hyperpigmentation, and scar. Chance of recurrence or persistence reviewed.     Destr of lesion - Head - Anterior (Face)  Complexity: simple    Destruction method: cryotherapy    Informed consent: discussed and consent obtained    Lesion destroyed using liquid nitrogen: Yes    Region frozen until ice ball extended beyond lesion: Yes    Cryotherapy cycles:  1  Outcome: patient tolerated procedure well with no complications    Post-procedure details: wound care  instructions given      Multiple benign melanocytic nevi    Benign melanocytic nevi  - Discussed benign nature and that no treatment is necessary unless it becomes painful or increases in size. Patient opts for clinical monitoring at this time.    - Sun protective behavior reviewed and encouraged including the use of over-the-counter sunscreen with SPF30+ daily (reapply every 1.5 hours when outdoors), UPF clothing, broad rimmed hats, sunglasses, and avoidance of midday sun. Home skin monitoring encouraged and how to monitor for skin cancer (changing or new moles, new rapidly growing or non-healing lesions) reviewed. Patient encouraged to call with interval concerns or changes.      Uriarte angioma    Cherry angioma(s)  - Discussed benign nature and that no treatment is necessary unless it becomes painful or increases in size. Patient opts for clinical monitoring at this time.      Seborrheic keratoses    Seborrheic keratosis (-es)  - Discussed benign nature and that no treatment is necessary unless it becomes painful or increases in size. Patient opts for clinical monitoring at this time.      Woody (3)  Head - Anterior (Face); Neck - Posterior; Generalized    - Reassured patient of benign nature of these lesions and that no treatment is required  - If desired she could pursue cosmetic visit for extraction, she will think about it at this time  - has previously used tretinoin, declines at this time    Encounter for screening for malignant neoplasm of skin    Related Procedures  Follow Up In Dermatology - Established Patient  Follow Up In Dermatology - Established Patient       Follow up for next available for hair loss  Follow up in 12 months for skin check    Ilsa Loomis MD   PGY-5 Dermatology Resident    I saw and evaluated the patient, participating in the key elements of the service.  I discussed the findings, assessment and plan with the resident and agree with resident’s findings and plan as documented in  the resident's note.  I was immediately available for the entirety of the procedure(s) and present for the key and critical portions.     Ana Alba MD

## 2025-04-25 ENCOUNTER — APPOINTMENT (OUTPATIENT)
Dept: DERMATOLOGY | Facility: CLINIC | Age: 75
End: 2025-04-25
Payer: MEDICARE

## 2025-04-25 DIAGNOSIS — L30.9 DERMATITIS: ICD-10-CM

## 2025-04-25 DIAGNOSIS — L65.9 ALOPECIA: Primary | ICD-10-CM

## 2025-04-25 LAB — POC KOH - DERM RESULT 1: NEGATIVE

## 2025-04-25 RX ORDER — FLUOCINONIDE 0.5 MG/G
OINTMENT TOPICAL
Qty: 60 G | Refills: 3 | Status: SHIPPED | OUTPATIENT
Start: 2025-04-25

## 2025-04-25 ASSESSMENT — DERMATOLOGY QUALITY OF LIFE (QOL) ASSESSMENT
ARE THERE EXCLUSIONS OR EXCEPTIONS FOR THE QUALITY OF LIFE ASSESSMENT: NO
RATE HOW BOTHERED YOU ARE BY SYMPTOMS OF YOUR SKIN PROBLEM (EG, ITCHING, STINGING BURNING, HURTING OR SKIN IRRITATION): 0 - NEVER BOTHERED
WHAT SINGLE SKIN CONDITION LISTED BELOW IS THE PATIENT ANSWERING THE QUALITY-OF-LIFE ASSESSMENT QUESTIONS ABOUT: NONE OF THE ABOVE
DATE THE QUALITY-OF-LIFE ASSESSMENT WAS COMPLETED: 67320
RATE HOW BOTHERED YOU ARE BY EFFECTS OF YOUR SKIN PROBLEMS ON YOUR ACTIVITIES (EG, GOING OUT, ACCOMPLISHING WHAT YOU WANT, WORK ACTIVITIES OR YOUR RELATIONSHIPS WITH OTHERS): 0 - NEVER BOTHERED
RATE HOW EMOTIONALLY BOTHERED YOU ARE BY YOUR SKIN PROBLEM (FOR EXAMPLE, WORRY, EMBARRASSMENT, FRUSTRATION): 4

## 2025-04-25 ASSESSMENT — DERMATOLOGY PATIENT ASSESSMENT
ARE YOU ON BIRTH CONTROL: NO
HAVE YOU HAD OR DO YOU HAVE VASCULAR DISEASE: NO
ARE YOU TRYING TO GET PREGNANT: NO
DO YOU HAVE ANY NEW OR CHANGING LESIONS: NO
DO YOU USE A TANNING BED: NO
DO YOU HAVE IRREGULAR MENSTRUAL CYCLES: NO
ARE YOU AN ORGAN TRANSPLANT RECIPIENT: NO
HAVE YOU HAD OR DO YOU HAVE A STAPH INFECTION: NO

## 2025-04-25 ASSESSMENT — PATIENT GLOBAL ASSESSMENT (PGA): PATIENT GLOBAL ASSESSMENT: PATIENT GLOBAL ASSESSMENT:  1 - CLEAR

## 2025-04-25 ASSESSMENT — ITCH NUMERIC RATING SCALE: HOW SEVERE IS YOUR ITCHING?: 0

## 2025-04-25 NOTE — PROGRESS NOTES
Subjective     Georgie Robles is a 74 y.o. female who presents for the following: Hair/Scalp Problem (Hair falling out started between 7886-0273. Feels it may have been related to the Covid vaccination./Olmestartan BP med started-reports hair falling out again. ). LCV 2/19/2025 for FBSE. In 3007-9174, around the time she got her COVID vaccine, she noticed more hair in the drain when she showered. Reports that hair loss was diffuse, but it eventually stopped and she returned to her baseline. Then in October, she was started on olmesartan for her BP. Noticed afterwards that there was more hair in the drain and diffuse thinning. Over the past couple of months, patient has noticed that her hair doesn't seem to be falling out anymore and is slowly growing back. Denies hair breakage. Denies any scalp pain or itching. She reports that her hair has always taken a long time to grow even from childhood. Reports mother had hair loss as well. Pt takes Nature's Bounty hair, skin, and nails supplement. Takes biotin 6000 mcg. Uses biotin and collagen shampoo and conditioner. Review of CBC, TSH, iron and TIBC, vitamin B12, and vitamin D from 10/2024 WNL. Denies any recent weight loss or other stressors.    Patient also complains of a pruritic scaly rash on her feet today that started a couple of weeks ago. Denies leg swelling and does not wear compression stockings. Uses an Olay body wash for her twice a day showers, does not regularly moisturize after. Notes that she had this previous in 2022 and was given triamcinolone 0.1% cream with improvement. She recently started using triamcinolone 0.1% cream again but is not sure if it is helping. Denies previous history of atopic dermatitis, asthma, or seasonal allergies.     Skin Cancer History  Biopsy Log Book  No skin cancers from Specimen Tracking.    Additional History      Review of Systems:  No other skin or systemic complaints other than what is documented elsewhere in the  note.    The following portions of the chart were reviewed this encounter and updated as appropriate:       Specialty Problems    None    Past Medical History:  Georgie Robles  has a past medical history of Encounter for gynecological examination (general) (routine) without abnormal findings (05/11/2015), Encounter for screening for malignant neoplasm, site unspecified (05/11/2015), Personal history of other diseases of the musculoskeletal system and connective tissue, Personal history of other diseases of the musculoskeletal system and connective tissue, and Personal history of other diseases of the nervous system and sense organs.    Past Surgical History:  Georgie Robles  has a past surgical history that includes Tonsillectomy (05/11/2015); Other surgical history (05/24/2021); and MR angio head wo IV contrast (5/10/2022).    Family History:  Patient family history includes Breast cancer (age of onset: 50) in her mother.    Social History:  Georgie Robles  reports that she has never smoked. She has never used smokeless tobacco. No history on file for alcohol use and drug use.    Allergies:  Epinephrine, Ephedrine, and Sulfa (sulfonamide antibiotics)    Current Medications / CAM's:  Current Medications[1]     Objective   Well appearing patient in no apparent distress; mood and affect are within normal limits.    A focused was performed including scalp, bilateral lower extremities, feet. All findings within normal limits unless otherwise noted below.    Scalp  Increased hair thinning and miniaturized hair follicles of the vertex scalp. Very fine hairs. Negative hair pull test.    Right Foot - Anterior  Xerotic erythematous patches with longitudinal cracking on the bilateral dorsal feet and ankles.       Assessment/Plan   ALOPECIA  Scalp  Alopecia - telogen effluvium (resolving) with additionally component of androgenetic alopecia  - Patient does note she is returning to baseline after recent episode of hair loss and is  not currently interested in pursuing treatment.   - Review of 10/2024 CBC, TSH, iron and TIBC, vitamin B12, and vitamin D WNL.  - Nature of telogen effluvium reviewed including temporary cause of hair loss following a major physicial or emotional stressor. Usually occurs 2-4 months after the inciting event, and often self-resolves in approximately 6 months.  - Time, stress management, and gentle hair care are the best treatments for telogen effluvium.   - Reviewed FDA approved (rogaine, low level laser light therapy) and off label treatment options for androgenetic alopecia. Patient was given information about rogaine (minoxidil) 5% foam daily. Reviewed need for regular daily use for 4-6 months before note significant benefit. Reviewed risks including potential initial shed, increase temple hirsuitism, itch. Patient will buy over the counter if interested.  - Recommend stopping biotin supplementation. 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 The goal of treatment is to restore the skin barrier and decrease inflammation and itching. Treatments include topical corticosteroid therapy when the skin is red, itchy and flaky.   - To prevent severity and frequency of recurrences a gentle skin care regimen should be followed which includes washing with a fragrance-free soap such as Dove  for sensitive skin, Cetaphil or Cerave body washes. Avoid hot showers. After rinsing, pat dry and apply a moisturizer such as Cerave, Cetaphil, or Vanicream. Creams are thicker than lotions and often provde better moisturization than lotions.  - Start fluocinonide 0.05% ointment to affected areas twice a day for 2 weeks. Then stop. Avoid face and body folds. The R/B/A of this medication was discussed with the patient.  POCT KOH Prep - DERM Manually Resulted - Right Foot - Anterior    fluocinonide (Lidex) 0.05 % ointment - Right Foot - Anterior  Apply twice a day to affected areas of the feet and legs for 2 weeks. Then stop. Can repeat for future flares. Avoid face and body folds.     RTC in 2/2026 as previously scheduled for FBSE.    Kassy Gonzalez MD  PGY-2, Dermatology     I saw and evaluated the patient. I personally obtained the key and critical portions of the history and physical exam or was physically present for key and critical portions performed by the resident/fellow. I reviewed the resident/fellow's documentation and discussed the patient with the resident/fellow. I agree with the resident/fellow's medical decision making as documented in the note.    Ana Alba MD          [1]   Current Outpatient Medications:     alendronate (Fosamax) 70 mg tablet, TAKE ONE TABLET BY MOUTH ONE TIME A WEEK., Disp: 4 tablet, Rfl: 10    brimonidine (AlphaGAN) 0.2 % ophthalmic solution, , Disp: , Rfl:     cholecalciferol (Vitamin D-3) 125 MCG (5000 UT) capsule, Take 1 capsule (5,000 Units) by mouth once daily., Disp: , Rfl:     cyanocobalamin (Vitamin B-12) 100 mcg tablet, , Disp: , Rfl:     latanoprost (Xalatan) 0.005 % ophthalmic solution, Administer 1 drop into affected eye(s)., Disp: , Rfl:     olmesartan (Benicar) 20 mg tablet, Take 1 tablet (20 mg) by mouth once daily., Disp: 90 tablet, Rfl: 1    timolol (Timoptic) 0.5 % ophthalmic solution, Administer 1 drop into affected eye(s) twice a day., Disp: , Rfl:      fluocinonide (Lidex) 0.05 % ointment, Apply twice a day to affected areas of the feet and legs for 2 weeks. Then stop. Can repeat for future flares. Avoid face and body folds., Disp: 60 g, Rfl: 3

## 2025-04-25 NOTE — Clinical Note
Increased hair thinning and miniaturized hair follicles of the vertex scalp. Very fine hairs. Negative hair pull test.

## 2025-04-25 NOTE — Clinical Note
Alopecia - telogen effluvium (resolving) with additionally component of androgenetic alopecia  - Patient does note she is returning to baseline after recent episode of hair loss and is not currently interested in pursuing treatment.   - Review of 10/2024 CBC, TSH, iron and TIBC, vitamin B12, and vitamin D WNL.  - Nature of telogen effluvium reviewed including temporary cause of hair loss following a major physicial or emotional stressor. Usually occurs 2-4 months after the inciting event, and often self-resolves in approximately 6 months.  - Time, stress management, and gentle hair care are the best treatments for telogen effluvium.   - Reviewed FDA approved (rogaine, low level laser light therapy) and off label treatment options for androgenetic alopecia. Patient was given information about rogaine (minoxidil) 5% foam daily. Reviewed need for regular daily use for 4-6 months before note significant benefit. Reviewed risks including potential initial shed, increase temple hirsuitism, itch. Patient will buy over the counter if interested.  - Recommend stopping biotin supplementation. Discussed that recent studies have shown no clear benefit to biotin supplementation and there are risks of clinically significant incorrect lab test (tsh, troponins) results when patients are on this medication  - Patient instructed that if she develops worsening hair loss or new scalp pain, itching, or redness - can return to consider other treatment options

## 2025-04-25 NOTE — Clinical Note
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 The goal of treatment is to restore the skin barrier and decrease inflammation and itching. Treatments include topical corticosteroid therapy when the skin is red, itchy and flaky.   - To prevent severity and frequency of recurrences a gentle skin care regimen should be followed which includes washing with a fragrance-free soap such as Dove for sensitive skin, Cetaphil or Cerave body washes. Avoid hot showers. After rinsing, pat dry and apply a moisturizer such as Cerave, Cetaphil, or Vanicream. Creams are thicker than lotions and often provde better moisturization than lotions.  - Start fluocinonide 0.05% ointment to affected areas twice a day for 2 weeks. Then stop. Avoid face and body folds. The R/B/A of this medication was discussed with the patient.

## 2025-05-01 ENCOUNTER — APPOINTMENT (OUTPATIENT)
Dept: PRIMARY CARE | Facility: CLINIC | Age: 75
End: 2025-05-01
Payer: MEDICARE

## 2025-05-01 VITALS
HEIGHT: 66 IN | WEIGHT: 197 LBS | OXYGEN SATURATION: 97 % | BODY MASS INDEX: 31.66 KG/M2 | HEART RATE: 62 BPM | SYSTOLIC BLOOD PRESSURE: 128 MMHG | DIASTOLIC BLOOD PRESSURE: 80 MMHG

## 2025-05-01 DIAGNOSIS — E78.2 MIXED HYPERLIPIDEMIA: ICD-10-CM

## 2025-05-01 DIAGNOSIS — E66.811 CLASS 1 OBESITY WITHOUT SERIOUS COMORBIDITY WITH BODY MASS INDEX (BMI) OF 31.0 TO 31.9 IN ADULT, UNSPECIFIED OBESITY TYPE: ICD-10-CM

## 2025-05-01 DIAGNOSIS — E55.9 VITAMIN D DEFICIENCY: ICD-10-CM

## 2025-05-01 DIAGNOSIS — Z00.00 ENCOUNTER FOR ANNUAL WELLNESS EXAM IN MEDICARE PATIENT: Primary | ICD-10-CM

## 2025-05-01 DIAGNOSIS — R53.83 FATIGUE, UNSPECIFIED TYPE: ICD-10-CM

## 2025-05-01 DIAGNOSIS — Z12.11 COLON CANCER SCREENING: ICD-10-CM

## 2025-05-01 DIAGNOSIS — Z00.00 ENCOUNTER FOR PREVENTIVE HEALTH EXAMINATION: ICD-10-CM

## 2025-05-01 DIAGNOSIS — I10 PRIMARY HYPERTENSION: ICD-10-CM

## 2025-05-01 PROCEDURE — 99397 PER PM REEVAL EST PAT 65+ YR: CPT | Performed by: STUDENT IN AN ORGANIZED HEALTH CARE EDUCATION/TRAINING PROGRAM

## 2025-05-01 PROCEDURE — 99214 OFFICE O/P EST MOD 30 MIN: CPT | Performed by: STUDENT IN AN ORGANIZED HEALTH CARE EDUCATION/TRAINING PROGRAM

## 2025-05-01 PROCEDURE — 3074F SYST BP LT 130 MM HG: CPT | Performed by: STUDENT IN AN ORGANIZED HEALTH CARE EDUCATION/TRAINING PROGRAM

## 2025-05-01 PROCEDURE — 1160F RVW MEDS BY RX/DR IN RCRD: CPT | Performed by: STUDENT IN AN ORGANIZED HEALTH CARE EDUCATION/TRAINING PROGRAM

## 2025-05-01 PROCEDURE — 1170F FXNL STATUS ASSESSED: CPT | Performed by: STUDENT IN AN ORGANIZED HEALTH CARE EDUCATION/TRAINING PROGRAM

## 2025-05-01 PROCEDURE — 1157F ADVNC CARE PLAN IN RCRD: CPT | Performed by: STUDENT IN AN ORGANIZED HEALTH CARE EDUCATION/TRAINING PROGRAM

## 2025-05-01 PROCEDURE — 1123F ACP DISCUSS/DSCN MKR DOCD: CPT | Performed by: STUDENT IN AN ORGANIZED HEALTH CARE EDUCATION/TRAINING PROGRAM

## 2025-05-01 PROCEDURE — 1158F ADVNC CARE PLAN TLK DOCD: CPT | Performed by: STUDENT IN AN ORGANIZED HEALTH CARE EDUCATION/TRAINING PROGRAM

## 2025-05-01 PROCEDURE — G0439 PPPS, SUBSEQ VISIT: HCPCS | Performed by: STUDENT IN AN ORGANIZED HEALTH CARE EDUCATION/TRAINING PROGRAM

## 2025-05-01 PROCEDURE — 1036F TOBACCO NON-USER: CPT | Performed by: STUDENT IN AN ORGANIZED HEALTH CARE EDUCATION/TRAINING PROGRAM

## 2025-05-01 PROCEDURE — 1159F MED LIST DOCD IN RCRD: CPT | Performed by: STUDENT IN AN ORGANIZED HEALTH CARE EDUCATION/TRAINING PROGRAM

## 2025-05-01 PROCEDURE — 3079F DIAST BP 80-89 MM HG: CPT | Performed by: STUDENT IN AN ORGANIZED HEALTH CARE EDUCATION/TRAINING PROGRAM

## 2025-05-01 RX ORDER — OLMESARTAN MEDOXOMIL 20 MG/1
20 TABLET ORAL DAILY
Qty: 90 TABLET | Refills: 1 | Status: SHIPPED | OUTPATIENT
Start: 2025-05-01 | End: 2025-10-28

## 2025-05-01 ASSESSMENT — ANXIETY QUESTIONNAIRES
2. NOT BEING ABLE TO STOP OR CONTROL WORRYING: NOT AT ALL
6. BECOMING EASILY ANNOYED OR IRRITABLE: NOT AT ALL
5. BEING SO RESTLESS THAT IT IS HARD TO SIT STILL: NOT AT ALL
4. TROUBLE RELAXING: NOT AT ALL
1. FEELING NERVOUS, ANXIOUS, OR ON EDGE: NOT AT ALL
3. WORRYING TOO MUCH ABOUT DIFFERENT THINGS: NOT AT ALL
7. FEELING AFRAID AS IF SOMETHING AWFUL MIGHT HAPPEN: NOT AT ALL
GAD7 TOTAL SCORE: 0
IF YOU CHECKED OFF ANY PROBLEMS ON THIS QUESTIONNAIRE, HOW DIFFICULT HAVE THESE PROBLEMS MADE IT FOR YOU TO DO YOUR WORK, TAKE CARE OF THINGS AT HOME, OR GET ALONG WITH OTHER PEOPLE: NOT DIFFICULT AT ALL

## 2025-05-01 ASSESSMENT — PATIENT HEALTH QUESTIONNAIRE - PHQ9
4. FEELING TIRED OR HAVING LITTLE ENERGY: NOT AT ALL
10. IF YOU CHECKED OFF ANY PROBLEMS, HOW DIFFICULT HAVE THESE PROBLEMS MADE IT FOR YOU TO DO YOUR WORK, TAKE CARE OF THINGS AT HOME, OR GET ALONG WITH OTHER PEOPLE: NOT DIFFICULT AT ALL
6. FEELING BAD ABOUT YOURSELF - OR THAT YOU ARE A FAILURE OR HAVE LET YOURSELF OR YOUR FAMILY DOWN: NOT AT ALL
9. THOUGHTS THAT YOU WOULD BE BETTER OFF DEAD, OR OF HURTING YOURSELF: NOT AT ALL
1. LITTLE INTEREST OR PLEASURE IN DOING THINGS: NOT AT ALL
8. MOVING OR SPEAKING SO SLOWLY THAT OTHER PEOPLE COULD HAVE NOTICED. OR THE OPPOSITE, BEING SO FIGETY OR RESTLESS THAT YOU HAVE BEEN MOVING AROUND A LOT MORE THAN USUAL: NOT AT ALL
7. TROUBLE CONCENTRATING ON THINGS, SUCH AS READING THE NEWSPAPER OR WATCHING TELEVISION: NOT AT ALL
2. FEELING DOWN, DEPRESSED OR HOPELESS: NOT AT ALL
SUM OF ALL RESPONSES TO PHQ9 QUESTIONS 1 AND 2: 0
5. POOR APPETITE OR OVEREATING: NOT AT ALL
3. TROUBLE FALLING OR STAYING ASLEEP OR SLEEPING TOO MUCH: NOT AT ALL
SUM OF ALL RESPONSES TO PHQ QUESTIONS 1-9: 0

## 2025-05-01 ASSESSMENT — ACTIVITIES OF DAILY LIVING (ADL)
GROCERY_SHOPPING: INDEPENDENT
DOING_HOUSEWORK: INDEPENDENT
TAKING_MEDICATION: INDEPENDENT
BATHING: INDEPENDENT
MANAGING_FINANCES: INDEPENDENT
DRESSING: INDEPENDENT

## 2025-05-01 ASSESSMENT — ENCOUNTER SYMPTOMS
DEPRESSION: 0
OCCASIONAL FEELINGS OF UNSTEADINESS: 0
LOSS OF SENSATION IN FEET: 0

## 2025-05-01 NOTE — PROGRESS NOTES
Subjective   Reason for Visit: Georgie Robles is an 74 y.o. female here for a Medicare Wellness visit.     Past Medical, Surgical, and Family History reviewed and updated in chart.    Reviewed all medications by prescribing practitioner or clinical pharmacist (such as prescriptions, OTCs, herbal therapies and supplements) and documented in the medical record.    HPI  She notes 2 months of fatigue and sleep disturbance.  She states that she is unable to sleep more than 4 hours at a time and is always awake for about 2 hours in the middle of the night.  This has really messed up her sleep patterns and has caused a lot of fatigue.  She is is wondering if something else could be going on other than just age-related sleep changes.    Hypertension  She is currently taking olmesartan 10 mg daily and doing well.  She likes cutting the 20 mg tabs in half.  Reports no lightheadedness or dizziness.  Denies chest pain, shortness of breath, headaches, visual changes, weakness, numbness, tingling, lower extremity edema.    Obesity  She is having difficulty with losing weight no matter what she does.  She is wondering if she would be eligible for weight loss medication at this time.    Patient Self Assessment of Health Status  Patient Self Assessment: Good    Nutrition and Exercise  Current Diet: Well Balanced Diet  Adequate Fluid Intake: No  Caffeine: Yes  Exercise Frequency: No Exercise    Functional Ability/Level of Safety  Cognitive Impairment Observed: No cognitive impairment observed  Cognitive Impairment Reported: No cognitive impairment reported by patient or family    Home Safety Risk Factors: None    Patient Care Team:  Belinda Gallego DO as PCP - General (Family Medicine)  Belinda Gallego DO as PCP - [employer] PCP     Review of Systems  All pertinent positive symptoms are included in history of present illness.    All other systems have been reviewed and are negative and noncontributory to this  "patient's current ailments.    Objective   Vitals:  /80   Pulse 62   Ht 1.676 m (5' 6\")   Wt 89.4 kg (197 lb)   SpO2 97%   BMI 31.80 kg/m²       Physical Exam  CONSTITUTIONAL - INAD. Not ill appearing.  SKIN - No lesions or rashes visualized. Good skin turgor.  HEAD - Atraumatic, normocephalic.  RESP - CTAB. No wheezing, rhonchi, or crackles.   CARDIAC - RRR. No murmurs, gallops, or rubs.  ABDOMEN - Soft, nontender, nondistended. No organomegaly.   PSYCH - Appropriate mood and affect.  EXT: No edema present    Assessment/Plan   Diagnoses and all orders for this visit:  Encounter for annual wellness exam in Medicare patient  Encounter for preventive health examination  A complete history and physical was performed today.  Vaccines are up to date.  Colon cancer screening ordered.  Mammogram is up to date  Colon cancer screening  -     Cologuard® colon cancer screening; Future  Fatigue, unspecified type  -     CBC; Future  -     TSH with reflex to Free T4 if abnormal; Future  -     Iron and TIBC; Future  -     Vitamin B12; Future  -     Folate; Future  Primary hypertension  Controlled on 10 mg of olmesartan daily, refills provided  -     Comprehensive Metabolic Panel; Future  -     Albumin-Creatinine Ratio, Urine Random; Future  -     olmesartan (Benicar) 20 mg tablet; Take 1 tablet (20 mg) by mouth once daily.  Class 1 obesity without serious comorbidity with body mass index (BMI) of 31.0 to 31.9 in adult, unspecified obesity type  I referred her to clinical pharmacy at this time to see if she qualifies for the patient assistance program for Wegovy or Zepbound  -     Referral to Clinical Pharmacy; Future  Mixed hyperlipidemia  -     Comprehensive Metabolic Panel; Future  -     Lipid Panel; Future  Vitamin D deficiency  -     Vitamin D 25-Hydroxy,Total (for eval of Vitamin D levels); Future    Return depends on if a medication is started for weight loss or not  "

## 2025-05-02 LAB
25(OH)D3+25(OH)D2 SERPL-MCNC: 50 NG/ML (ref 30–100)
ALBUMIN SERPL-MCNC: 4.4 G/DL (ref 3.6–5.1)
ALBUMIN/CREAT UR: 8 MG/G CREAT
ALP SERPL-CCNC: 69 U/L (ref 37–153)
ALT SERPL-CCNC: 16 U/L (ref 6–29)
ANION GAP SERPL CALCULATED.4IONS-SCNC: 12 MMOL/L (CALC) (ref 7–17)
AST SERPL-CCNC: 17 U/L (ref 10–35)
BILIRUB SERPL-MCNC: 0.7 MG/DL (ref 0.2–1.2)
BUN SERPL-MCNC: 16 MG/DL (ref 7–25)
CALCIUM SERPL-MCNC: 9.5 MG/DL (ref 8.6–10.4)
CHLORIDE SERPL-SCNC: 108 MMOL/L (ref 98–110)
CHOLEST SERPL-MCNC: 222 MG/DL
CHOLEST/HDLC SERPL: 4.9 (CALC)
CO2 SERPL-SCNC: 20 MMOL/L (ref 20–32)
CREAT SERPL-MCNC: 0.69 MG/DL (ref 0.6–1)
CREAT UR-MCNC: 215 MG/DL (ref 20–275)
EGFRCR SERPLBLD CKD-EPI 2021: 90 ML/MIN/1.73M2
ERYTHROCYTE [DISTWIDTH] IN BLOOD BY AUTOMATED COUNT: 12.6 % (ref 11–15)
FOLATE SERPL-MCNC: 18.7 NG/ML
GLUCOSE SERPL-MCNC: 87 MG/DL (ref 65–99)
HCT VFR BLD AUTO: 43.6 % (ref 35–45)
HDLC SERPL-MCNC: 45 MG/DL
HGB BLD-MCNC: 15 G/DL (ref 11.7–15.5)
IRON SATN MFR SERPL: 31 % (CALC) (ref 16–45)
IRON SERPL-MCNC: 89 MCG/DL (ref 45–160)
LDLC SERPL CALC-MCNC: 154 MG/DL (CALC)
MCH RBC QN AUTO: 31.2 PG (ref 27–33)
MCHC RBC AUTO-ENTMCNC: 34.4 G/DL (ref 32–36)
MCV RBC AUTO: 90.6 FL (ref 80–100)
MICROALBUMIN UR-MCNC: 1.8 MG/DL
NONHDLC SERPL-MCNC: 177 MG/DL (CALC)
PLATELET # BLD AUTO: 237 THOUSAND/UL (ref 140–400)
PMV BLD REES-ECKER: 10.7 FL (ref 7.5–12.5)
POTASSIUM SERPL-SCNC: 3.8 MMOL/L (ref 3.5–5.3)
PROT SERPL-MCNC: 7.1 G/DL (ref 6.1–8.1)
RBC # BLD AUTO: 4.81 MILLION/UL (ref 3.8–5.1)
SODIUM SERPL-SCNC: 140 MMOL/L (ref 135–146)
TIBC SERPL-MCNC: 284 MCG/DL (CALC) (ref 250–450)
TRIGL SERPL-MCNC: 115 MG/DL
TSH SERPL-ACNC: 1.71 MIU/L (ref 0.4–4.5)
VIT B12 SERPL-MCNC: 440 PG/ML (ref 200–1100)
WBC # BLD AUTO: 5 THOUSAND/UL (ref 3.8–10.8)

## 2025-05-06 NOTE — PROGRESS NOTES
Clinical Pharmacy Appointment    Patient ID: Georgie Robles is a 74 y.o. female who presents for Obesity.    Pt is here for First appointment.     Referring Provider: Belinda Gallego DO  PCP: Belinda Gallego DO   Last visit with PCP: 5/1/25   Next visit with PCP: Not scheduled    Subjective     Medication Reconciliation:  Changed: Vitamin D3, olmesartan (taking 1/2 tablet instead of full tablet)  Added:   Citracal with vitamin D3  Aspirin 81 mg daily.   No personal history of cardiovascular disease. Family history of stroke.  Discontinued: None    Drug Interactions  No relevant drug interactions were noted.    Medication System Management  Patient's preferred pharmacy: [facility]  Adherence/Organization: No concerns  Affordability/Accessibility: GLP1 cost review    HPI    OBESITY ASSESSMENT  Current Medications:   None    Previous Medications:  None    Pertinent PMH:   Personal/family history of thyroid cancer: No  PMH of MEN2: No  Personal history of pancreatitis: No  CAD: No  Heart attack: No  Stroke: No  Sleep apnea: No   Hyperlipidemia: Yes  Hypertension: Yes    Weight monitoring:   Current weight 197 lbs (current BMI 31.80) - Obesity (class 1): BMI 30 to <35  Goal weight loss: lose 20-25 lbs    Lifestyle: No formal exercise but has an acre of land and doing lots of yard work.     The 10-year ASCVD risk score (Marcus DK, et al., 2019) is: 21.2%    Values used to calculate the score:      Age: 74 years      Sex: Female      Is Non- : No      Diabetic: No      Tobacco smoker: No      Systolic Blood Pressure: 128 mmHg      Is BP treated: Yes      HDL Cholesterol: 45 mg/dL      Total Cholesterol: 222 mg/dL     Objective   Allergies[1]  Social History     Social History Narrative    Not on file      Medication Review  Current Outpatient Medications   Medication Instructions    alendronate (Fosamax) 70 mg tablet TAKE ONE TABLET BY MOUTH ONE TIME A WEEK.    aspirin 81 mg, Daily     brimonidine (AlphaGAN) 0.2 % ophthalmic solution Administer 1 drop into both eyes 2 times a day.    calcium citrate/vitamin D3 (CITRACAL PLUS D ORAL) 1 tablet, Daily    cholecalciferol (VITAMIN D3) 25 mcg, Daily    fluocinonide (Lidex) 0.05 % ointment Apply twice a day to affected areas of the feet and legs for 2 weeks. Then stop. Can repeat for future flares. Avoid face and body folds.    latanoprost (Xalatan) 0.005 % ophthalmic solution 1 drop, Nightly    olmesartan (BENICAR) 20 mg, oral, Daily    timolol (Timoptic) 0.5 % ophthalmic solution 1 drop, 2 times daily      Vitals  BP Readings from Last 3 Encounters:   05/01/25 128/80   10/29/24 120/60   10/01/24 146/86     Labs  A1C  Lab Results   Component Value Date    HGBA1C 4.9 10/29/2024    HGBA1C 4.9 01/29/2024     BMP  Lab Results   Component Value Date    CALCIUM 9.5 05/01/2025     05/01/2025    K 3.8 05/01/2025    CO2 20 05/01/2025     05/01/2025    BUN 16 05/01/2025    CREATININE 0.69 05/01/2025    EGFR 90 05/01/2025     LFTs  Lab Results   Component Value Date    ALT 16 05/01/2025    AST 17 05/01/2025    ALKPHOS 69 05/01/2025    BILITOT 0.7 05/01/2025     FLP  Lab Results   Component Value Date    TRIG 115 05/01/2025    CHOL 222 (H) 05/01/2025    LDLCALC 154 (H) 05/01/2025    HDL 45 (L) 05/01/2025     Urine Microalbumin  Lab Results   Component Value Date    MICROALBCREA 8 05/01/2025     Weight Management  Wt Readings from Last 3 Encounters:   05/01/25 89.4 kg (197 lb)   10/29/24 86.2 kg (190 lb)   10/01/24 87.1 kg (192 lb)      BMI Readings from Last 3 Encounters:   05/01/25 31.80 kg/m²   10/29/24 30.67 kg/m²   10/01/24 30.99 kg/m²        Assessment/Plan   Problem List Items Addressed This Visit       Class 1 obesity without serious comorbidity with body mass index (BMI) of 31.0 to 31.9 in adult    Relevant Orders    Referral to Nutrition Services     Current weight 197 lbs (current BMI 31.80) - Obesity (class 1): BMI 30 to <35. Patient would  benefit from GLP1 therapy for weight loss and management of weight-related co-morbidities (hyperlipidemia, hypertension). Unfortunately, Zepbound and Wegovy are NOT covered by this patient's Medicare Part D plan. Reviewed GLP1 agonists out-of-pocket costs with patient (Zepbound: $349/month for 2.5 mg vials, $499/month for 5 mg, 7.5 mg, and 10 mg vials; $650/month for Zepbound PENS (all strengths)). Patient does not wish to pay for the medication and wishes to defer pharmacological treatment at this time. Encouraged patient to continue focusing on lifestyle modifications. Patient is interested in meeting with a dietitian. Referral placed.    Clinical Pharmacist follow-up: Not neccessary at this time. Patient to reach out as needed.    Future Appointments   Date Time Provider Department Center   2/18/2026 10:45 AM Ana Alba MD [id number]       Continue all meds under the continuation of care with the referring provider and clinical pharmacy team.    Thank you,  Heaven Tracy  Clinical Pharmacist  [phone]    Verbal consent to manage patient's drug therapy was obtained from the patient. They were informed they may decline to participate or withdraw from participation in pharmacy services at any time.         [1]   Allergies  Allergen Reactions    Epinephrine Syncope     Passes out    Ephedrine Other    Sulfa (Sulfonamide Antibiotics) Unknown

## 2025-05-07 ENCOUNTER — TELEMEDICINE (OUTPATIENT)
Dept: PHARMACY | Facility: HOSPITAL | Age: 75
End: 2025-05-07
Payer: MEDICARE

## 2025-05-07 DIAGNOSIS — E66.811 CLASS 1 OBESITY WITHOUT SERIOUS COMORBIDITY WITH BODY MASS INDEX (BMI) OF 31.0 TO 31.9 IN ADULT, UNSPECIFIED OBESITY TYPE: ICD-10-CM

## 2025-05-07 RX ORDER — CHOLECALCIFEROL (VITAMIN D3) 25 MCG
25 TABLET ORAL DAILY
COMMUNITY

## 2025-05-07 RX ORDER — ASPIRIN 81 MG/1
81 TABLET ORAL DAILY
COMMUNITY

## 2025-05-16 ENCOUNTER — APPOINTMENT (OUTPATIENT)
Dept: NUTRITION | Facility: HOSPITAL | Age: 75
End: 2025-05-16
Payer: MEDICARE

## 2025-05-16 NOTE — PROGRESS NOTES
Spoke with pt and she decided to pass on having her nutrition appointment as she has Medicare and it is not covered by her insurance.

## 2025-05-28 LAB — NONINV COLON CA DNA+OCC BLD SCRN STL QL: NEGATIVE

## 2025-08-22 ENCOUNTER — PATIENT MESSAGE (OUTPATIENT)
Dept: OBSTETRICS AND GYNECOLOGY | Facility: CLINIC | Age: 75
End: 2025-08-22
Payer: MEDICARE

## 2025-08-22 DIAGNOSIS — Z12.31 VISIT FOR SCREENING MAMMOGRAM: Primary | ICD-10-CM

## 2025-08-26 ENCOUNTER — APPOINTMENT (OUTPATIENT)
Dept: RADIOLOGY | Facility: HOSPITAL | Age: 75
End: 2025-08-26
Payer: MEDICARE

## 2025-08-26 VITALS — HEIGHT: 66 IN | BODY MASS INDEX: 30.53 KG/M2 | WEIGHT: 190 LBS

## 2025-08-26 DIAGNOSIS — Z12.31 VISIT FOR SCREENING MAMMOGRAM: ICD-10-CM

## 2025-08-26 PROCEDURE — 77063 BREAST TOMOSYNTHESIS BI: CPT | Performed by: STUDENT IN AN ORGANIZED HEALTH CARE EDUCATION/TRAINING PROGRAM

## 2025-08-26 PROCEDURE — 77067 SCR MAMMO BI INCL CAD: CPT

## 2025-08-26 PROCEDURE — 77067 SCR MAMMO BI INCL CAD: CPT | Performed by: STUDENT IN AN ORGANIZED HEALTH CARE EDUCATION/TRAINING PROGRAM

## 2026-02-18 ENCOUNTER — APPOINTMENT (OUTPATIENT)
Dept: DERMATOLOGY | Facility: CLINIC | Age: 76
End: 2026-02-18
Payer: MEDICARE